# Patient Record
Sex: MALE | Race: NATIVE HAWAIIAN OR OTHER PACIFIC ISLANDER | HISPANIC OR LATINO | Employment: FULL TIME | URBAN - NONMETROPOLITAN AREA
[De-identification: names, ages, dates, MRNs, and addresses within clinical notes are randomized per-mention and may not be internally consistent; named-entity substitution may affect disease eponyms.]

---

## 2024-06-03 ENCOUNTER — HOSPITAL ENCOUNTER (EMERGENCY)
Facility: HOSPITAL | Age: 44
Discharge: LEFT BEFORE DISCHARGE | End: 2024-06-03
Attending: EMERGENCY MEDICINE | Admitting: EMERGENCY MEDICINE

## 2024-06-03 ENCOUNTER — APPOINTMENT (OUTPATIENT)
Dept: GENERAL RADIOLOGY | Facility: HOSPITAL | Age: 44
End: 2024-06-03

## 2024-06-03 VITALS
BODY MASS INDEX: 27.55 KG/M2 | SYSTOLIC BLOOD PRESSURE: 113 MMHG | HEIGHT: 65 IN | DIASTOLIC BLOOD PRESSURE: 75 MMHG | WEIGHT: 165.34 LBS | OXYGEN SATURATION: 95 % | RESPIRATION RATE: 17 BRPM | TEMPERATURE: 98.1 F | HEART RATE: 70 BPM

## 2024-06-03 DIAGNOSIS — I45.9 HEART BLOCK: Primary | ICD-10-CM

## 2024-06-03 LAB
ALBUMIN SERPL-MCNC: 3.8 G/DL (ref 3.5–5.2)
ALBUMIN/GLOB SERPL: 1.1 G/DL
ALP SERPL-CCNC: 84 U/L (ref 39–117)
ALT SERPL W P-5'-P-CCNC: 19 U/L (ref 1–41)
ANION GAP SERPL CALCULATED.3IONS-SCNC: 12.1 MMOL/L (ref 5–15)
AST SERPL-CCNC: 14 U/L (ref 1–40)
BASOPHILS # BLD AUTO: 0.03 10*3/MM3 (ref 0–0.2)
BASOPHILS NFR BLD AUTO: 0.3 % (ref 0–1.5)
BILIRUB SERPL-MCNC: 0.4 MG/DL (ref 0–1.2)
BUN SERPL-MCNC: 13 MG/DL (ref 6–20)
BUN/CREAT SERPL: 15.5 (ref 7–25)
CALCIUM SPEC-SCNC: 8.9 MG/DL (ref 8.6–10.5)
CHLORIDE SERPL-SCNC: 105 MMOL/L (ref 98–107)
CO2 SERPL-SCNC: 23.9 MMOL/L (ref 22–29)
CREAT SERPL-MCNC: 0.84 MG/DL (ref 0.76–1.27)
CRP SERPL-MCNC: 9.68 MG/DL (ref 0–0.5)
DEPRECATED RDW RBC AUTO: 44.2 FL (ref 37–54)
EGFRCR SERPLBLD CKD-EPI 2021: 112.4 ML/MIN/1.73
EOSINOPHIL # BLD AUTO: 0.3 10*3/MM3 (ref 0–0.4)
EOSINOPHIL NFR BLD AUTO: 3.5 % (ref 0.3–6.2)
ERYTHROCYTE [DISTWIDTH] IN BLOOD BY AUTOMATED COUNT: 13.5 % (ref 12.3–15.4)
GLOBULIN UR ELPH-MCNC: 3.5 GM/DL
GLUCOSE SERPL-MCNC: 104 MG/DL (ref 65–99)
HCT VFR BLD AUTO: 40.9 % (ref 37.5–51)
HGB BLD-MCNC: 13.9 G/DL (ref 13–17.7)
HOLD SPECIMEN: NORMAL
HOLD SPECIMEN: NORMAL
IMM GRANULOCYTES # BLD AUTO: 0.04 10*3/MM3 (ref 0–0.05)
IMM GRANULOCYTES NFR BLD AUTO: 0.5 % (ref 0–0.5)
LYMPHOCYTES # BLD AUTO: 1.24 10*3/MM3 (ref 0.7–3.1)
LYMPHOCYTES NFR BLD AUTO: 14.4 % (ref 19.6–45.3)
MAGNESIUM SERPL-MCNC: 2.1 MG/DL (ref 1.6–2.6)
MCH RBC QN AUTO: 30.5 PG (ref 26.6–33)
MCHC RBC AUTO-ENTMCNC: 34 G/DL (ref 31.5–35.7)
MCV RBC AUTO: 89.9 FL (ref 79–97)
MONOCYTES # BLD AUTO: 0.53 10*3/MM3 (ref 0.1–0.9)
MONOCYTES NFR BLD AUTO: 6.2 % (ref 5–12)
NEUTROPHILS NFR BLD AUTO: 6.46 10*3/MM3 (ref 1.7–7)
NEUTROPHILS NFR BLD AUTO: 75.1 % (ref 42.7–76)
NRBC BLD AUTO-RTO: 0 /100 WBC (ref 0–0.2)
PLATELET # BLD AUTO: 377 10*3/MM3 (ref 140–450)
PMV BLD AUTO: 9.3 FL (ref 6–12)
POTASSIUM SERPL-SCNC: 3.5 MMOL/L (ref 3.5–5.2)
PROT SERPL-MCNC: 7.3 G/DL (ref 6–8.5)
QT INTERVAL: 406 MS
QTC INTERVAL: 431 MS
RBC # BLD AUTO: 4.55 10*6/MM3 (ref 4.14–5.8)
SODIUM SERPL-SCNC: 141 MMOL/L (ref 136–145)
TROPONIN T SERPL HS-MCNC: <6 NG/L
TSH SERPL DL<=0.05 MIU/L-ACNC: 2.28 UIU/ML (ref 0.27–4.2)
WBC NRBC COR # BLD AUTO: 8.6 10*3/MM3 (ref 3.4–10.8)
WHOLE BLOOD HOLD COAG: NORMAL
WHOLE BLOOD HOLD SPECIMEN: NORMAL

## 2024-06-03 PROCEDURE — 93005 ELECTROCARDIOGRAM TRACING: CPT | Performed by: STUDENT IN AN ORGANIZED HEALTH CARE EDUCATION/TRAINING PROGRAM

## 2024-06-03 PROCEDURE — 71045 X-RAY EXAM CHEST 1 VIEW: CPT

## 2024-06-03 PROCEDURE — 83735 ASSAY OF MAGNESIUM: CPT | Performed by: PHYSICIAN ASSISTANT

## 2024-06-03 PROCEDURE — 84484 ASSAY OF TROPONIN QUANT: CPT | Performed by: STUDENT IN AN ORGANIZED HEALTH CARE EDUCATION/TRAINING PROGRAM

## 2024-06-03 PROCEDURE — 36415 COLL VENOUS BLD VENIPUNCTURE: CPT | Performed by: STUDENT IN AN ORGANIZED HEALTH CARE EDUCATION/TRAINING PROGRAM

## 2024-06-03 PROCEDURE — 80053 COMPREHEN METABOLIC PANEL: CPT | Performed by: STUDENT IN AN ORGANIZED HEALTH CARE EDUCATION/TRAINING PROGRAM

## 2024-06-03 PROCEDURE — 84443 ASSAY THYROID STIM HORMONE: CPT | Performed by: PHYSICIAN ASSISTANT

## 2024-06-03 PROCEDURE — 99284 EMERGENCY DEPT VISIT MOD MDM: CPT

## 2024-06-03 PROCEDURE — 86140 C-REACTIVE PROTEIN: CPT | Performed by: PHYSICIAN ASSISTANT

## 2024-06-03 PROCEDURE — 85025 COMPLETE CBC W/AUTO DIFF WBC: CPT | Performed by: STUDENT IN AN ORGANIZED HEALTH CARE EDUCATION/TRAINING PROGRAM

## 2024-06-03 PROCEDURE — 71045 X-RAY EXAM CHEST 1 VIEW: CPT | Performed by: RADIOLOGY

## 2024-06-03 PROCEDURE — 93005 ELECTROCARDIOGRAM TRACING: CPT | Performed by: EMERGENCY MEDICINE

## 2024-06-03 RX ORDER — SODIUM CHLORIDE 0.9 % (FLUSH) 0.9 %
10 SYRINGE (ML) INJECTION AS NEEDED
Status: DISCONTINUED | OUTPATIENT
Start: 2024-06-03 | End: 2024-06-03 | Stop reason: HOSPADM

## 2024-06-03 RX ORDER — ATORVASTATIN CALCIUM 10 MG/1
20 TABLET, FILM COATED ORAL DAILY
Qty: 90 TABLET | Refills: 0 | Status: SHIPPED | OUTPATIENT
Start: 2024-06-03

## 2024-06-03 RX ORDER — ASPIRIN 325 MG
325 TABLET ORAL ONCE
Status: DISCONTINUED | OUTPATIENT
Start: 2024-06-03 | End: 2024-06-03 | Stop reason: HOSPADM

## 2024-06-03 RX ORDER — ASPIRIN 325 MG
325 TABLET, DELAYED RELEASE (ENTERIC COATED) ORAL DAILY
Qty: 90 TABLET | Refills: 0 | Status: SHIPPED | OUTPATIENT
Start: 2024-06-03

## 2024-06-03 RX ORDER — ATORVASTATIN CALCIUM 20 MG/1
20 TABLET, FILM COATED ORAL NIGHTLY
Status: DISCONTINUED | OUTPATIENT
Start: 2024-06-03 | End: 2024-06-03 | Stop reason: HOSPADM

## 2024-06-03 NOTE — ED NOTES
MEDICAL SCREENING:    Reason for Visit: CP x 3 days    Patient initially seen in triage.  The patient was advised further evaluation and diagnostic testing will be needed, some of the treatment and testing will be initiated in the lobby in order to begin the process.  The patient will be returned to the waiting area for the time being and possibly be re-assessed by a subsequent ED provider.  The patient will be brought back to the treatment area in as timely manner as possible.       Ebenezer Diana, PA-C  06/03/24 1033

## 2024-06-03 NOTE — ED NOTES
Patient requesting to leave AMA at this time. Dr. Al at bedside, Patient states he understands the risks against signing out against medical advise and is agreeable.

## 2024-06-03 NOTE — ED NOTES
Patient remains in ER lobby. Patient and friend accompanying explained wait due to high patient volume, voices understanding. Patient voices no change in condition or concerns. Patient and friend instructed to notify triage RN or ED staff of any needs or concerns. CATA

## 2024-06-03 NOTE — ED PROVIDER NOTES
Subjective   History of Present Illness  Presents to eR with chest pressure for two days. He denies palpitations    Chest Pain  Pain location:  Substernal area  Pain quality: pressure    Pain radiates to:  Does not radiate  Pain severity:  Mild  Onset quality:  Gradual  Duration:  2 days  Timing:  Constant  Chronicity:  New      Review of Systems   Constitutional:  Positive for activity change.   HENT: Negative.     Eyes: Negative.    Respiratory: Negative.     Cardiovascular:  Positive for chest pain.   Gastrointestinal: Negative.    Endocrine: Negative.    Genitourinary: Negative.    Musculoskeletal: Negative.    Allergic/Immunologic: Negative.    Neurological: Negative.    Hematological: Negative.    Psychiatric/Behavioral: Negative.         No past medical history on file.    No Known Allergies    No past surgical history on file.    No family history on file.    Social History     Socioeconomic History    Marital status:            Objective   Physical Exam  Vitals and nursing note reviewed.   Constitutional:       Appearance: He is well-developed.   HENT:      Head: Normocephalic.   Eyes:      Pupils: Pupils are equal, round, and reactive to light.   Cardiovascular:      Rate and Rhythm: Normal rate. Rhythm irregular.      Heart sounds: Normal heart sounds.   Pulmonary:      Effort: Pulmonary effort is normal.      Breath sounds: Examination of the right-lower field reveals decreased breath sounds. Examination of the left-lower field reveals decreased breath sounds.   Abdominal:      Palpations: Abdomen is soft.   Musculoskeletal:         General: Normal range of motion.      Cervical back: Normal range of motion.   Skin:     General: Skin is warm and dry.   Neurological:      General: No focal deficit present.      Mental Status: He is alert.         Procedures           ED Course                                             Medical Decision Making  Amount and/or Complexity of Data Reviewed  Labs:  ordered.  Radiology: ordered.  ECG/medicine tests: ordered.    Risk  OTC drugs.  Prescription drug management.        Final diagnoses:   Heart block       ED Disposition  ED Disposition       ED Disposition   AMA    Condition   --    Comment   --               Nathan Brooke MD  [address] [place] [id number] [phone]               Medication List        New Prescriptions      aspirin 325 MG EC tablet  Take 1 tablet by mouth Daily.     atorvastatin 10 MG tablet  Commonly known as: LIPITOR  Take 2 tablets by mouth Daily.               Where to Get Your Medications        These medications were sent to [id number] - [place], [place] - [address] [facility] - [phone]  - [phone]   [address] [place] [address]      Phone: [phone]   aspirin 325 MG EC tablet  atorvastatin 10 MG tablet            Chapito Al MD  06/03/24 8948

## 2024-06-13 ENCOUNTER — HOSPITAL ENCOUNTER (INPATIENT)
Facility: HOSPITAL | Age: 44
LOS: 1 days | Discharge: HOME | DRG: 310 | End: 2024-06-14
Attending: EMERGENCY MEDICINE | Admitting: INTERNAL MEDICINE

## 2024-06-13 ENCOUNTER — APPOINTMENT (OUTPATIENT)
Dept: GENERAL RADIOLOGY | Facility: HOSPITAL | Age: 44
DRG: 310 | End: 2024-06-13

## 2024-06-13 DIAGNOSIS — I44.2 COMPLETE HEART BLOCK: Primary | ICD-10-CM

## 2024-06-13 LAB
ALBUMIN SERPL-MCNC: 3.9 G/DL (ref 3.5–5.2)
ALBUMIN/GLOB SERPL: 1.1 G/DL
ALP SERPL-CCNC: 105 U/L (ref 39–117)
ALT SERPL W P-5'-P-CCNC: 45 U/L (ref 1–41)
ANION GAP SERPL CALCULATED.3IONS-SCNC: 10.3 MMOL/L (ref 5–15)
AST SERPL-CCNC: 37 U/L (ref 1–40)
BASOPHILS # BLD AUTO: 0.03 10*3/MM3 (ref 0–0.2)
BASOPHILS NFR BLD AUTO: 0.4 % (ref 0–1.5)
BILIRUB SERPL-MCNC: 0.2 MG/DL (ref 0–1.2)
BUN SERPL-MCNC: 12 MG/DL (ref 6–20)
BUN/CREAT SERPL: 14.8 (ref 7–25)
CALCIUM SPEC-SCNC: 9.3 MG/DL (ref 8.6–10.5)
CHLORIDE SERPL-SCNC: 105 MMOL/L (ref 98–107)
CO2 SERPL-SCNC: 23.7 MMOL/L (ref 22–29)
CREAT SERPL-MCNC: 0.81 MG/DL (ref 0.76–1.27)
DEPRECATED RDW RBC AUTO: 45.4 FL (ref 37–54)
EGFRCR SERPLBLD CKD-EPI 2021: 113.6 ML/MIN/1.73
EOSINOPHIL # BLD AUTO: 0.32 10*3/MM3 (ref 0–0.4)
EOSINOPHIL NFR BLD AUTO: 4.1 % (ref 0.3–6.2)
ERYTHROCYTE [DISTWIDTH] IN BLOOD BY AUTOMATED COUNT: 13.8 % (ref 12.3–15.4)
GEN 5 2HR TROPONIN T REFLEX: 7 NG/L
GLOBULIN UR ELPH-MCNC: 3.6 GM/DL
GLUCOSE SERPL-MCNC: 99 MG/DL (ref 65–99)
HBA1C MFR BLD: 5.9 % (ref 4.8–5.6)
HCT VFR BLD AUTO: 41.1 % (ref 37.5–51)
HGB BLD-MCNC: 13.9 G/DL (ref 13–17.7)
HOLD SPECIMEN: NORMAL
HOLD SPECIMEN: NORMAL
IMM GRANULOCYTES # BLD AUTO: 0.04 10*3/MM3 (ref 0–0.05)
IMM GRANULOCYTES NFR BLD AUTO: 0.5 % (ref 0–0.5)
INR PPP: 0.99 (ref 0.9–1.1)
LYMPHOCYTES # BLD AUTO: 2.2 10*3/MM3 (ref 0.7–3.1)
LYMPHOCYTES NFR BLD AUTO: 28.2 % (ref 19.6–45.3)
MAGNESIUM SERPL-MCNC: 2.3 MG/DL (ref 1.6–2.6)
MCH RBC QN AUTO: 30.5 PG (ref 26.6–33)
MCHC RBC AUTO-ENTMCNC: 33.8 G/DL (ref 31.5–35.7)
MCV RBC AUTO: 90.3 FL (ref 79–97)
MONOCYTES # BLD AUTO: 0.68 10*3/MM3 (ref 0.1–0.9)
MONOCYTES NFR BLD AUTO: 8.7 % (ref 5–12)
NEUTROPHILS NFR BLD AUTO: 4.54 10*3/MM3 (ref 1.7–7)
NEUTROPHILS NFR BLD AUTO: 58.1 % (ref 42.7–76)
NRBC BLD AUTO-RTO: 0 /100 WBC (ref 0–0.2)
PHOSPHATE SERPL-MCNC: 3.7 MG/DL (ref 2.5–4.5)
PLATELET # BLD AUTO: 391 10*3/MM3 (ref 140–450)
PMV BLD AUTO: 9.1 FL (ref 6–12)
POTASSIUM SERPL-SCNC: 3.9 MMOL/L (ref 3.5–5.2)
PROT SERPL-MCNC: 7.5 G/DL (ref 6–8.5)
PROTHROMBIN TIME: 13.2 SECONDS (ref 12.1–14.7)
RBC # BLD AUTO: 4.55 10*6/MM3 (ref 4.14–5.8)
SODIUM SERPL-SCNC: 139 MMOL/L (ref 136–145)
TROPONIN T DELTA: 0 NG/L
TROPONIN T SERPL HS-MCNC: 7 NG/L
WBC NRBC COR # BLD AUTO: 7.81 10*3/MM3 (ref 3.4–10.8)
WHOLE BLOOD HOLD COAG: NORMAL
WHOLE BLOOD HOLD SPECIMEN: NORMAL

## 2024-06-13 PROCEDURE — 85025 COMPLETE CBC W/AUTO DIFF WBC: CPT | Performed by: EMERGENCY MEDICINE

## 2024-06-13 PROCEDURE — 83036 HEMOGLOBIN GLYCOSYLATED A1C: CPT | Performed by: INTERNAL MEDICINE

## 2024-06-13 PROCEDURE — 84484 ASSAY OF TROPONIN QUANT: CPT | Performed by: EMERGENCY MEDICINE

## 2024-06-13 PROCEDURE — 71045 X-RAY EXAM CHEST 1 VIEW: CPT | Performed by: RADIOLOGY

## 2024-06-13 PROCEDURE — 83735 ASSAY OF MAGNESIUM: CPT | Performed by: EMERGENCY MEDICINE

## 2024-06-13 PROCEDURE — 99285 EMERGENCY DEPT VISIT HI MDM: CPT

## 2024-06-13 PROCEDURE — 99223 1ST HOSP IP/OBS HIGH 75: CPT | Performed by: INTERNAL MEDICINE

## 2024-06-13 PROCEDURE — 93005 ELECTROCARDIOGRAM TRACING: CPT | Performed by: EMERGENCY MEDICINE

## 2024-06-13 PROCEDURE — 93010 ELECTROCARDIOGRAM REPORT: CPT | Performed by: INTERNAL MEDICINE

## 2024-06-13 PROCEDURE — 36415 COLL VENOUS BLD VENIPUNCTURE: CPT

## 2024-06-13 PROCEDURE — 71045 X-RAY EXAM CHEST 1 VIEW: CPT

## 2024-06-13 PROCEDURE — 93005 ELECTROCARDIOGRAM TRACING: CPT | Performed by: STUDENT IN AN ORGANIZED HEALTH CARE EDUCATION/TRAINING PROGRAM

## 2024-06-13 PROCEDURE — 80053 COMPREHEN METABOLIC PANEL: CPT | Performed by: EMERGENCY MEDICINE

## 2024-06-13 PROCEDURE — 85610 PROTHROMBIN TIME: CPT | Performed by: EMERGENCY MEDICINE

## 2024-06-13 PROCEDURE — 84100 ASSAY OF PHOSPHORUS: CPT | Performed by: INTERNAL MEDICINE

## 2024-06-13 RX ORDER — DEXTROSE MONOHYDRATE 25 G/50ML
25 INJECTION, SOLUTION INTRAVENOUS
Status: DISCONTINUED | OUTPATIENT
Start: 2024-06-13 | End: 2024-06-14 | Stop reason: HOSPADM

## 2024-06-13 RX ORDER — ATORVASTATIN CALCIUM 20 MG/1
20 TABLET, FILM COATED ORAL DAILY
Status: CANCELLED | OUTPATIENT
Start: 2024-06-14

## 2024-06-13 RX ORDER — GLUCAGON 1 MG/ML
1 KIT INJECTION
Status: DISCONTINUED | OUTPATIENT
Start: 2024-06-13 | End: 2024-06-14 | Stop reason: HOSPADM

## 2024-06-13 RX ORDER — ASPIRIN 81 MG/1
324 TABLET, CHEWABLE ORAL ONCE
Status: COMPLETED | OUTPATIENT
Start: 2024-06-13 | End: 2024-06-13

## 2024-06-13 RX ORDER — SODIUM CHLORIDE 0.9 % (FLUSH) 0.9 %
10 SYRINGE (ML) INJECTION EVERY 12 HOURS SCHEDULED
Status: DISCONTINUED | OUTPATIENT
Start: 2024-06-13 | End: 2024-06-14 | Stop reason: HOSPADM

## 2024-06-13 RX ORDER — NITROGLYCERIN 0.4 MG/1
0.4 TABLET SUBLINGUAL
Status: DISCONTINUED | OUTPATIENT
Start: 2024-06-13 | End: 2024-06-14 | Stop reason: HOSPADM

## 2024-06-13 RX ORDER — BISACODYL 5 MG/1
5 TABLET, DELAYED RELEASE ORAL DAILY PRN
Status: DISCONTINUED | OUTPATIENT
Start: 2024-06-13 | End: 2024-06-14 | Stop reason: HOSPADM

## 2024-06-13 RX ORDER — NICOTINE POLACRILEX 4 MG
15 LOZENGE BUCCAL
Status: DISCONTINUED | OUTPATIENT
Start: 2024-06-13 | End: 2024-06-14 | Stop reason: HOSPADM

## 2024-06-13 RX ORDER — POLYETHYLENE GLYCOL 3350 17 G/17G
17 POWDER, FOR SOLUTION ORAL DAILY PRN
Status: DISCONTINUED | OUTPATIENT
Start: 2024-06-13 | End: 2024-06-14 | Stop reason: HOSPADM

## 2024-06-13 RX ORDER — ASPIRIN 81 MG/1
81 TABLET ORAL DAILY
Status: DISCONTINUED | OUTPATIENT
Start: 2024-06-14 | End: 2024-06-14 | Stop reason: HOSPADM

## 2024-06-13 RX ORDER — SODIUM CHLORIDE 9 MG/ML
40 INJECTION, SOLUTION INTRAVENOUS AS NEEDED
Status: DISCONTINUED | OUTPATIENT
Start: 2024-06-13 | End: 2024-06-14 | Stop reason: HOSPADM

## 2024-06-13 RX ORDER — ASPIRIN 325 MG
325 TABLET, DELAYED RELEASE (ENTERIC COATED) ORAL DAILY
Status: CANCELLED | OUTPATIENT
Start: 2024-06-14

## 2024-06-13 RX ORDER — SODIUM CHLORIDE 0.9 % (FLUSH) 0.9 %
10 SYRINGE (ML) INJECTION AS NEEDED
Status: DISCONTINUED | OUTPATIENT
Start: 2024-06-13 | End: 2024-06-14 | Stop reason: HOSPADM

## 2024-06-13 RX ORDER — AMOXICILLIN 250 MG
2 CAPSULE ORAL 2 TIMES DAILY PRN
Status: DISCONTINUED | OUTPATIENT
Start: 2024-06-13 | End: 2024-06-14 | Stop reason: HOSPADM

## 2024-06-13 RX ORDER — BISACODYL 10 MG
10 SUPPOSITORY, RECTAL RECTAL DAILY PRN
Status: DISCONTINUED | OUTPATIENT
Start: 2024-06-13 | End: 2024-06-14 | Stop reason: HOSPADM

## 2024-06-13 RX ORDER — HEPARIN SODIUM 5000 [USP'U]/ML
5000 INJECTION, SOLUTION INTRAVENOUS; SUBCUTANEOUS EVERY 12 HOURS SCHEDULED
Status: DISCONTINUED | OUTPATIENT
Start: 2024-06-13 | End: 2024-06-14 | Stop reason: HOSPADM

## 2024-06-13 RX ADMIN — ASPIRIN 324 MG: 81 TABLET, CHEWABLE ORAL at 17:33

## 2024-06-13 NOTE — ED PROVIDER NOTES
Subjective   History of Present Illness  43-year-old  male who presents today for evaluation of chest pain/follow-up.  Patient was seen in emergency department on Isi 3.  He was diagnosed with complete heart block at that time.  He apparently did not want to stay, and left AMA.  Patient apparently was called back by the hospital and told to come back today for an appointment at 5.  He states he is currently is asymptomatic he has no complaints of chest pain, palpitations, he states sometimes he does gets mildly short of breath, and sometimes some mild orthopnea.  He states he does work during the day, he has no complaints while working.  No leg pain or leg swelling, no fevers, chills, sweats, no nausea, no vomiting, no cough, no hemoptysis.  Symptoms are minimal currently, no exacerbating alleviating factors.    History provided by:  Patient   used: Yes        Review of Systems   All other systems reviewed and are negative.      No past medical history on file.    No Known Allergies    No past surgical history on file.    No family history on file.    Social History     Socioeconomic History    Marital status:            Objective   Physical Exam  Vitals and nursing note reviewed.   Constitutional:       Appearance: He is well-developed.      Comments: Pleasant well-appearing 43-year-old male lying in bed no acute distress, nontoxic, well-appearing   HENT:      Head: Normocephalic and atraumatic.      Nose: Nose normal.   Eyes:      Conjunctiva/sclera: Conjunctivae normal.      Pupils: Pupils are equal, round, and reactive to light.   Neck:      Vascular: No JVD.      Trachea: No tracheal deviation.   Cardiovascular:      Rate and Rhythm: Normal rate and regular rhythm.   Pulmonary:      Effort: Pulmonary effort is normal.      Comments: Patient speaking in full sentences, no respiratory distress, no retractions, no abdominal breathing.  Abdominal:      Palpations: Abdomen is  soft.   Musculoskeletal:         General: Normal range of motion.      Cervical back: Normal range of motion and neck supple.      Comments: No lower extremity edema, no palpable cord, compartment soft without reproducible tenderness.   Skin:     General: Skin is warm and dry.   Neurological:      Mental Status: He is alert.         Procedures           ED Course  ED Course as of 06/13/24 2135   Thu Jun 13, 2024   1734 ECG 12 Lead ED Triage Standing Order; Chest Pain  Sinus rhythm with first-degree AV block rate of 83 QRS 94 QTc 427, no ST segment elevation, depression, T wave versions or signs of acute ischemia no signs of acute dysrhythmia.  Unremarkable EKG, interpreted by myself.  Compared to EKG from Isi 3, the complete heart block is now resolved.      Electronically signed by Constantin Woody MD, 06/13/24, 5:34 PM EDT.   [SM]      ED Course User Index  [SM] Constantin Woody MD                                             Medical Decision Making  43-year-old male who presents today for evaluation after leaving AGAINST MEDICAL ADVICE on Isi 3.  Patient was seen for chest pain, he is found to have a third-degree heart block.  Admission was discussed however the patient did not want to stay.  His workup is unremarkable today, he is asymptomatic, his EKG shows normal sinus rhythm with first-degree AV block, blood work is unremarkable signs of electrolyte abnormality, or signs of cardiac strain or injury.  I discussed case with cardiologist, who requested the patient be admitted for further testing and evaluation.  Plan was discussed with the patient via , and he voices understanding and is in agreement to staying for further workup.  Patient will be admitted to the hospital service for further management.    Problems Addressed:  Complete heart block: complicated acute illness or injury    Amount and/or Complexity of Data Reviewed  Labs: ordered.  Radiology: ordered.  ECG/medicine tests:  ordered. Decision-making details documented in ED Course.    Risk  OTC drugs.  Prescription drug management.  Decision regarding hospitalization.        Final diagnoses:   Complete heart block       ED Disposition  ED Disposition       ED Disposition   Decision to Admit    Condition   --    Comment   Level of Care: Telemetry [5]   Diagnosis: Third degree heart block [219010]   Certification: I Certify That Inpatient Hospital Services Are Medically Necessary For Greater Than 2 Midnights                 No follow-up provider specified.       Medication List      No changes were made to your prescriptions during this visit.            Constantin Woody MD  06/13/24 2382

## 2024-06-14 ENCOUNTER — APPOINTMENT (OUTPATIENT)
Dept: CARDIOLOGY | Facility: HOSPITAL | Age: 44
DRG: 310 | End: 2024-06-14

## 2024-06-14 ENCOUNTER — APPOINTMENT (OUTPATIENT)
Dept: NUCLEAR MEDICINE | Facility: HOSPITAL | Age: 44
DRG: 310 | End: 2024-06-14

## 2024-06-14 VITALS
BODY MASS INDEX: 33.47 KG/M2 | HEART RATE: 71 BPM | DIASTOLIC BLOOD PRESSURE: 68 MMHG | RESPIRATION RATE: 17 BRPM | SYSTOLIC BLOOD PRESSURE: 117 MMHG | HEIGHT: 65 IN | WEIGHT: 200.9 LBS | TEMPERATURE: 97.6 F | OXYGEN SATURATION: 95 %

## 2024-06-14 PROBLEM — I44.2 THIRD DEGREE HEART BLOCK: Status: RESOLVED | Noted: 2024-06-13 | Resolved: 2024-06-14

## 2024-06-14 LAB
ALBUMIN SERPL-MCNC: 3.4 G/DL (ref 3.5–5.2)
ALBUMIN/GLOB SERPL: 1 G/DL
ALP SERPL-CCNC: 93 U/L (ref 39–117)
ALT SERPL W P-5'-P-CCNC: 40 U/L (ref 1–41)
AMPHET+METHAMPHET UR QL: NEGATIVE
AMPHETAMINES UR QL: NEGATIVE
ANION GAP SERPL CALCULATED.3IONS-SCNC: 9.4 MMOL/L (ref 5–15)
AST SERPL-CCNC: 31 U/L (ref 1–40)
BARBITURATES UR QL SCN: NEGATIVE
BENZODIAZ UR QL SCN: NEGATIVE
BH CV ECHO MEAS - AO MAX PG: 7.7 MMHG
BH CV ECHO MEAS - AO MEAN PG: 5 MMHG
BH CV ECHO MEAS - AO ROOT DIAM: 3.3 CM
BH CV ECHO MEAS - AO V2 MAX: 139 CM/SEC
BH CV ECHO MEAS - AO V2 VTI: 28.3 CM
BH CV ECHO MEAS - EDV(CUBED): 125 ML
BH CV ECHO MEAS - EDV(MOD-SP4): 94.5 ML
BH CV ECHO MEAS - EF(MOD-SP4): 71.6 %
BH CV ECHO MEAS - ESV(CUBED): 46.7 ML
BH CV ECHO MEAS - ESV(MOD-SP4): 26.8 ML
BH CV ECHO MEAS - FS: 28 %
BH CV ECHO MEAS - IVS/LVPW: 1.08 CM
BH CV ECHO MEAS - IVSD: 1.4 CM
BH CV ECHO MEAS - LA DIMENSION: 3.5 CM
BH CV ECHO MEAS - LAT PEAK E' VEL: 13.3 CM/SEC
BH CV ECHO MEAS - LV DIASTOLIC VOL/BSA (35-75): 47.8 CM2
BH CV ECHO MEAS - LV MASS(C)D: 276.4 GRAMS
BH CV ECHO MEAS - LV SYSTOLIC VOL/BSA (12-30): 13.5 CM2
BH CV ECHO MEAS - LVIDD: 5 CM
BH CV ECHO MEAS - LVIDS: 3.6 CM
BH CV ECHO MEAS - LVOT AREA: 3.5 CM2
BH CV ECHO MEAS - LVOT DIAM: 2.1 CM
BH CV ECHO MEAS - LVPWD: 1.3 CM
BH CV ECHO MEAS - MED PEAK E' VEL: 9.7 CM/SEC
BH CV ECHO MEAS - MV A MAX VEL: 63 CM/SEC
BH CV ECHO MEAS - MV E MAX VEL: 82.3 CM/SEC
BH CV ECHO MEAS - MV E/A: 1.31
BH CV ECHO MEAS - PA ACC TIME: 0.07 SEC
BH CV ECHO MEAS - RAP SYSTOLE: 10 MMHG
BH CV ECHO MEAS - RVSP: 39.2 MMHG
BH CV ECHO MEAS - SV(MOD-SP4): 67.7 ML
BH CV ECHO MEAS - SVI(MOD-SP4): 34.2 ML/M2
BH CV ECHO MEAS - TAPSE (>1.6): 3.2 CM
BH CV ECHO MEAS - TR MAX PG: 29.2 MMHG
BH CV ECHO MEAS - TR MAX VEL: 270 CM/SEC
BH CV ECHO MEASUREMENTS AVERAGE E/E' RATIO: 7.16
BH CV NUCLEAR PRIOR STUDY: 3
BH CV REST NUCLEAR ISOTOPE DOSE: 9.5 MCI
BH CV STRESS BP STAGE 1: NORMAL
BH CV STRESS BP STAGE 2: NORMAL
BH CV STRESS BP STAGE 3: NORMAL
BH CV STRESS DURATION MIN STAGE 1: 3
BH CV STRESS DURATION MIN STAGE 2: 3
BH CV STRESS DURATION MIN STAGE 3: 3
BH CV STRESS DURATION SEC STAGE 1: 0
BH CV STRESS DURATION SEC STAGE 2: 0
BH CV STRESS DURATION SEC STAGE 3: 0
BH CV STRESS DURATION SEC STAGE 4: 24
BH CV STRESS GRADE STAGE 1: 10
BH CV STRESS GRADE STAGE 2: 12
BH CV STRESS GRADE STAGE 3: 14
BH CV STRESS GRADE STAGE 4: 16
BH CV STRESS HR STAGE 1: 100
BH CV STRESS HR STAGE 2: 120
BH CV STRESS HR STAGE 3: 141
BH CV STRESS HR STAGE 4: 144
BH CV STRESS METS STAGE 1: 5
BH CV STRESS METS STAGE 2: 7.5
BH CV STRESS METS STAGE 3: 10
BH CV STRESS METS STAGE 4: 13.5
BH CV STRESS NUCLEAR ISOTOPE DOSE: 28.6 MCI
BH CV STRESS PROTOCOL 1: NORMAL
BH CV STRESS RECOVERY BP: NORMAL MMHG
BH CV STRESS RECOVERY HR: 82 BPM
BH CV STRESS SPEED STAGE 1: 1.7
BH CV STRESS SPEED STAGE 2: 2.5
BH CV STRESS SPEED STAGE 3: 3.4
BH CV STRESS SPEED STAGE 4: 4.2
BH CV STRESS STAGE 1: 1
BH CV STRESS STAGE 2: 2
BH CV STRESS STAGE 3: 3
BH CV STRESS STAGE 4: 4
BILIRUB SERPL-MCNC: 0.2 MG/DL (ref 0–1.2)
BILIRUB UR QL STRIP: NEGATIVE
BUN SERPL-MCNC: 11 MG/DL (ref 6–20)
BUN/CREAT SERPL: 14.7 (ref 7–25)
BUPRENORPHINE SERPL-MCNC: NEGATIVE NG/ML
CALCIUM SPEC-SCNC: 8.7 MG/DL (ref 8.6–10.5)
CANNABINOIDS SERPL QL: NEGATIVE
CHLORIDE SERPL-SCNC: 108 MMOL/L (ref 98–107)
CHOLEST SERPL-MCNC: 118 MG/DL (ref 0–200)
CLARITY UR: CLEAR
CO2 SERPL-SCNC: 22.6 MMOL/L (ref 22–29)
COCAINE UR QL: NEGATIVE
COLOR UR: YELLOW
CREAT SERPL-MCNC: 0.75 MG/DL (ref 0.76–1.27)
DEPRECATED RDW RBC AUTO: 46.4 FL (ref 37–54)
EGFRCR SERPLBLD CKD-EPI 2021: 116.3 ML/MIN/1.73
ERYTHROCYTE [DISTWIDTH] IN BLOOD BY AUTOMATED COUNT: 13.9 % (ref 12.3–15.4)
FENTANYL UR-MCNC: NEGATIVE NG/ML
GLOBULIN UR ELPH-MCNC: 3.5 GM/DL
GLUCOSE SERPL-MCNC: 108 MG/DL (ref 65–99)
GLUCOSE UR STRIP-MCNC: NEGATIVE MG/DL
HCT VFR BLD AUTO: 42 % (ref 37.5–51)
HDLC SERPL-MCNC: 31 MG/DL (ref 40–60)
HGB BLD-MCNC: 13.8 G/DL (ref 13–17.7)
HGB UR QL STRIP.AUTO: NEGATIVE
KETONES UR QL STRIP: NEGATIVE
LDLC SERPL CALC-MCNC: 60 MG/DL (ref 0–100)
LDLC/HDLC SERPL: 1.8 {RATIO}
LEFT ATRIUM VOLUME INDEX: 11.9 ML/M2
LEUKOCYTE ESTERASE UR QL STRIP.AUTO: NEGATIVE
LV EF NUC BP: 72 %
MAGNESIUM SERPL-MCNC: 2.2 MG/DL (ref 1.6–2.6)
MAXIMAL PREDICTED HEART RATE: 179 BPM
MCH RBC QN AUTO: 30.1 PG (ref 26.6–33)
MCHC RBC AUTO-ENTMCNC: 32.9 G/DL (ref 31.5–35.7)
MCV RBC AUTO: 91.7 FL (ref 79–97)
METHADONE UR QL SCN: NEGATIVE
NITRITE UR QL STRIP: NEGATIVE
OPIATES UR QL: NEGATIVE
OXYCODONE UR QL SCN: NEGATIVE
PCP UR QL SCN: NEGATIVE
PERCENT MAX PREDICTED HR: 80.45 %
PH UR STRIP.AUTO: 6 [PH] (ref 5–8)
PLATELET # BLD AUTO: 358 10*3/MM3 (ref 140–450)
PMV BLD AUTO: 8.9 FL (ref 6–12)
POTASSIUM SERPL-SCNC: 3.6 MMOL/L (ref 3.5–5.2)
PROT SERPL-MCNC: 6.9 G/DL (ref 6–8.5)
PROT UR QL STRIP: NEGATIVE
QT INTERVAL: 364 MS
QTC INTERVAL: 427 MS
RBC # BLD AUTO: 4.58 10*6/MM3 (ref 4.14–5.8)
SODIUM SERPL-SCNC: 140 MMOL/L (ref 136–145)
SP GR UR STRIP: 1.02 (ref 1–1.03)
STRESS BASELINE BP: NORMAL MMHG
STRESS BASELINE HR: 67 BPM
STRESS PERCENT HR: 95 %
STRESS POST ESTIMATED WORKLOAD: 11.3 METS
STRESS POST EXERCISE DUR MIN: 9 MIN
STRESS POST EXERCISE DUR SEC: 24 SEC
STRESS POST PEAK BP: NORMAL MMHG
STRESS POST PEAK HR: 144 BPM
STRESS TARGET HR: 152 BPM
TRICYCLICS UR QL SCN: NEGATIVE
TRIGL SERPL-MCNC: 156 MG/DL (ref 0–150)
TROPONIN T SERPL HS-MCNC: 8 NG/L
UROBILINOGEN UR QL STRIP: NORMAL
VLDLC SERPL-MCNC: 27 MG/DL (ref 5–40)
WBC NRBC COR # BLD AUTO: 7.58 10*3/MM3 (ref 3.4–10.8)

## 2024-06-14 PROCEDURE — 0 TECHNETIUM SESTAMIBI: Performed by: INTERNAL MEDICINE

## 2024-06-14 PROCEDURE — 93017 CV STRESS TEST TRACING ONLY: CPT

## 2024-06-14 PROCEDURE — 93306 TTE W/DOPPLER COMPLETE: CPT

## 2024-06-14 PROCEDURE — 93018 CV STRESS TEST I&R ONLY: CPT | Performed by: INTERNAL MEDICINE

## 2024-06-14 PROCEDURE — A9500 TC99M SESTAMIBI: HCPCS | Performed by: INTERNAL MEDICINE

## 2024-06-14 PROCEDURE — 80307 DRUG TEST PRSMV CHEM ANLYZR: CPT | Performed by: INTERNAL MEDICINE

## 2024-06-14 PROCEDURE — 25010000002 HEPARIN (PORCINE) PER 1000 UNITS: Performed by: INTERNAL MEDICINE

## 2024-06-14 PROCEDURE — 80061 LIPID PANEL: CPT | Performed by: INTERNAL MEDICINE

## 2024-06-14 PROCEDURE — 99239 HOSP IP/OBS DSCHRG MGMT >30: CPT | Performed by: INTERNAL MEDICINE

## 2024-06-14 PROCEDURE — 78452 HT MUSCLE IMAGE SPECT MULT: CPT

## 2024-06-14 PROCEDURE — 93306 TTE W/DOPPLER COMPLETE: CPT | Performed by: INTERNAL MEDICINE

## 2024-06-14 PROCEDURE — 85027 COMPLETE CBC AUTOMATED: CPT | Performed by: INTERNAL MEDICINE

## 2024-06-14 PROCEDURE — 81003 URINALYSIS AUTO W/O SCOPE: CPT | Performed by: INTERNAL MEDICINE

## 2024-06-14 PROCEDURE — 80053 COMPREHEN METABOLIC PANEL: CPT | Performed by: INTERNAL MEDICINE

## 2024-06-14 PROCEDURE — 84484 ASSAY OF TROPONIN QUANT: CPT | Performed by: INTERNAL MEDICINE

## 2024-06-14 PROCEDURE — 99254 IP/OBS CNSLTJ NEW/EST MOD 60: CPT | Performed by: SPECIALIST

## 2024-06-14 PROCEDURE — 83735 ASSAY OF MAGNESIUM: CPT | Performed by: INTERNAL MEDICINE

## 2024-06-14 PROCEDURE — 78452 HT MUSCLE IMAGE SPECT MULT: CPT | Performed by: INTERNAL MEDICINE

## 2024-06-14 RX ORDER — ATORVASTATIN CALCIUM 40 MG/1
40 TABLET, FILM COATED ORAL DAILY
Status: DISCONTINUED | OUTPATIENT
Start: 2024-06-14 | End: 2024-06-14 | Stop reason: HOSPADM

## 2024-06-14 RX ORDER — ASPIRIN 81 MG/1
81 TABLET ORAL DAILY
Qty: 30 TABLET | Refills: 1 | Status: SHIPPED | OUTPATIENT
Start: 2024-06-14

## 2024-06-14 RX ADMIN — TECHNETIUM TC 99M SESTAMIBI 1 DOSE: 1 INJECTION INTRAVENOUS at 11:30

## 2024-06-14 RX ADMIN — ASPIRIN 81 MG: 81 TABLET, COATED ORAL at 10:10

## 2024-06-14 RX ADMIN — TECHNETIUM TC 99M SESTAMIBI 1 DOSE: 1 INJECTION INTRAVENOUS at 09:26

## 2024-06-14 RX ADMIN — HEPARIN SODIUM 5000 UNITS: 5000 INJECTION INTRAVENOUS; SUBCUTANEOUS at 09:35

## 2024-06-14 RX ADMIN — ATORVASTATIN CALCIUM 40 MG: 40 TABLET, FILM COATED ORAL at 05:39

## 2024-06-14 NOTE — H&P
[facility]    Patient Identification:  Name:  Daquan Singh  Age:  43 y.o.  Sex:  male  :  1980  MRN:  [id number]   Visit Number:  [id number]  Admit Date: 2024   Room number:  3325/1P  Primary Care Physician:  Provider, No Known    Date of Admission: 2024     Subjective     Chief complaint:    Chief Complaint   Patient presents with    Chest Pain     History of presenting illness:  43 y.o. male who was admitted on 2024 from the ED as a call back from his 6/3/2024 ED visit.  The patient has a past medical history of essential hypertension and hyperlipidemia.  He does not have a cardiac history and has never been diagnosed with an MI nor heart failure.  Patient came to our emergency department on 6/3/2024 with substernal chest pressure that radiated to his left arm.  EKG at that time demonstrated a third-degree heart block; troponins were unremarkable.  Unfortunately, the patient left the emergency department AGAINST MEDICAL ADVICE at that time.  Per the patient, he received a phone call today that he had a heart doctor appointment at our hospital.  However, when he arrived, there was no appointment and the patient was directed to the emergency department.  The patient has not had any chest pain since the episode 10 days prior to admission.  His current EKG does not show a third-degree heart block.  Patient states that his bilateral ankles have been swelling for the last 2 to 3 days.  Since the patient had a concerning EKG 10 days ago with concurrent chest pain, the patient is being admitted to the telemetry floor for further evaluation and treatment    Please note that the patient's nighttime bedside nurse was present during my evaluation.  I communicated with the patient via a remote .    ---------------------------------------------------------------------------------------------------------------------   Review of  Systems   Unable to perform ROS: Intubated   Constitutional:  Negative for diaphoresis and fever.   HENT:  Negative for congestion and rhinorrhea.    Respiratory:  Negative for cough and shortness of breath.    Cardiovascular:  Positive for chest pain and leg swelling.        Associated with left arm pain.   Gastrointestinal:  Negative for diarrhea, nausea and vomiting.   Psychiatric/Behavioral:  Negative for agitation, behavioral problems and confusion.    ---------------------------------------------------------------------------------------------------------------------   Past Medical History:   Diagnosis Date    Hyperlipidemia     Hypertension     Third degree heart block 06/13/2024     Past Surgical History:   Procedure Laterality Date    NO PAST SURGERIES       Family History   Problem Relation Age of Onset    Heart disease Other         Not at a young age    Diabetes Other      Social History     Socioeconomic History    Marital status:    Tobacco Use    Smoking status: Never    Smokeless tobacco: Never   Vaping Use    Vaping status: Never Used   Substance and Sexual Activity    Alcohol use: Yes     Alcohol/week: 5.0 standard drinks of alcohol     Types: 5 Cans of beer per week    Drug use: Never    Sexual activity: Defer     ---------------------------------------------------------------------------------------------------------------------   Allergies:  Patient has no known allergies.  ---------------------------------------------------------------------------------------------------------------------   Medications below are reported home medications pulling from within the system; at this time, these medications have not been reconciled unless otherwise specified and are in the verification process for further verification as current home medications.      Prior to Admission Medications       Prescriptions Last Dose Informant Patient Reported? Taking?    aspirin 325 MG EC tablet   No No    Take 1  tablet by mouth Daily.    atorvastatin (LIPITOR) 10 MG tablet   No No    Take 2 tablets by mouth Daily.          Objective     Vital Signs:  Temp:  [97.2 °F (36.2 °C)-98.2 °F (36.8 °C)] 97.7 °F (36.5 °C)  Heart Rate:  [58-83] 81  Resp:  [17-18] 18  BP: (114-151)/(64-89) 114/64    Mean Arterial Pressure (Non-Invasive) for the past 24 hrs (Last 3 readings):   Noninvasive MAP (mmHg)   06/14/24 0313 81   06/14/24 0009 88   06/13/24 2154 114     SpO2:  [90 %-96 %] 90 %  on  Flow (L/min):  [3] 3;   Device (Oxygen Therapy): room air  Body mass index is 33.43 kg/m².    Wt Readings from Last 3 Encounters:   06/13/24 91.1 kg (200 lb 14.4 oz)   06/03/24 75 kg (165 lb 5.5 oz)      ----------------------------------------------------------------------------------------------------------------------  Physical Exam  Vitals and nursing note reviewed. Exam conducted with a chaperone present.   Constitutional:       General: He is awake. He is not in acute distress.     Appearance: He is well-developed. He is obese. He is not ill-appearing, toxic-appearing or diaphoretic.      Comments: On room air.   HENT:      Head: Normocephalic and atraumatic.      Right Ear: External ear normal.      Left Ear: External ear normal.      Nose: Nose normal.   Eyes:      General: No scleral icterus.        Right eye: No discharge.         Left eye: No discharge.      Extraocular Movements: Extraocular movements intact.      Conjunctiva/sclera: Conjunctivae normal.      Pupils: Pupils are equal, round, and reactive to light.   Cardiovascular:      Rate and Rhythm: Normal rate and regular rhythm.      Pulses: Normal pulses.   Pulmonary:      Effort: Pulmonary effort is normal. No respiratory distress.      Breath sounds: No wheezing or rales.   Abdominal:      General: Bowel sounds are normal. There is no distension.      Palpations: Abdomen is soft.   Musculoskeletal:      Right ankle: Swelling present.      Left ankle: Swelling present.       Comments: His ankles have about 1+ pitting edema bilaterally and equally.   Skin:     General: Skin is warm.      Capillary Refill: Capillary refill takes less than 2 seconds.      Coloration: Skin is not jaundiced or pale.      Findings: No bruising.   Neurological:      Mental Status: He is alert and oriented to person, place, and time. Mental status is at baseline.      Cranial Nerves: No cranial nerve deficit.   Psychiatric:         Mood and Affect: Mood normal.         Behavior: Behavior normal. Behavior is cooperative.         Thought Content: Thought content normal.         Judgment: Judgment normal.     ---------------------------------------------------------------------------------------------------------------------  EK/3/2024: Third degree heart block with a heart rate of 68 and a QTc of 431 ms.  There are inverted T waves in leads V1-V3 and poor R wave progression with 2 PVCs.    2024: Normal sinus rhythm with a heart rate of 83 and a QTc of 427 ms.  The R wave progression is slightly improved.  There is only a T wave inversion in lead V1.  There is no obvious block.    Please note that I have personally looked at the EKG from this admission and the above is my interpretation of this admission's EKG; I await the final cardiology read.  Please note that there are no comparison EKGs in our system.      Telemetry:  NS with heart rates 50-60; then out of no where he has drops to the 30s.  No blocks.  Please note that I personally looked at the telemetry strips.      Last echocardiogram:  Ordered  --------------------------------------------------------------------------------------------------------------------  LABS:    CBC and coagulation:  Results from last 7 days   Lab Units 24  0327 24  1733   WBC 10*3/mm3 7.58 7.81   HEMOGLOBIN g/dL 13.8 13.9   HEMATOCRIT % 42.0 41.1   MCV fL 91.7 90.3   MCHC g/dL 32.9 33.8   PLATELETS 10*3/mm3 358 391   INR   --  0.99     Renal and  electrolytes:  Results from last 7 days   Lab Units 06/14/24 0327 06/13/24 1934 06/13/24  1733   SODIUM mmol/L 140  --  139   POTASSIUM mmol/L 3.6  --  3.9   MAGNESIUM mg/dL 2.2  --  2.3   CHLORIDE mmol/L 108*  --  105   CO2 mmol/L 22.6  --  23.7   BUN mg/dL 11  --  12   CREATININE mg/dL 0.75*  --  0.81   CALCIUM mg/dL 8.7  --  9.3   PHOSPHORUS mg/dL  --  3.7  --    GLUCOSE mg/dL 108*  --  99   ANION GAP mmol/L 9.4  --  10.3     Estimated Creatinine Clearance: 134.4 mL/min (A) (by C-G formula based on SCr of 0.75 mg/dL (L)).    Liver and pancreatic function:  Results from last 7 days   Lab Units 06/14/24 0327 06/13/24  1733   ALBUMIN g/dL 3.4* 3.9   BILIRUBIN mg/dL 0.2 0.2   ALK PHOS U/L 93 105   AST (SGOT) U/L 31 37   ALT (SGPT) U/L 40 45*     Endocrine function:  Lab Results   Component Value Date    HGBA1C 5.90 (H) 06/13/2024     Lab Results   Component Value Date    TSH 2.280 06/03/2024     Cardiac:  Results from last 7 days   Lab Units 06/14/24 0327 06/13/24 1934 06/13/24  1733   HSTROP T ng/L 8 7 7     Results from last 7 days   Lab Units 06/14/24 0327   CHOLESTEROL mg/dL 118   TRIGLYCERIDES mg/dL 156*   HDL CHOL mg/dL 31*   LDL CHOL mg/dL 60     Cultures:  Lab Results   Component Value Date    COLORU Yellow 06/14/2024    CLARITYU Clear 06/14/2024    PHUR 6.0 06/14/2024    GLUCOSEU Negative 06/14/2024    KETONESU Negative 06/14/2024    BLOODU Negative 06/14/2024    NITRITEU Negative 06/14/2024    LEUKOCYTESUR Negative 06/14/2024    BILIRUBINUR Negative 06/14/2024    UROBILINOGEN 0.2 E.U./dL 06/14/2024      Latest Reference Range & Units 06/14/24 05:40   Amphetamine, Urine Qual Negative  Negative   Barbiturates Screen, Urine Negative  Negative   Benzodiazepine Screen, Urine Negative  Negative   Buprenorphine, Screen, Urine Negative  Negative   Cocaine Screen, Urine Negative  Negative   Fentanyl, Urine Negative  Negative   Methamphetamine, Ur Negative  Negative   Methadone Screen , Urine Negative  Negative    Opiate Screen Negative  Negative   Oxycodone Screen, Urine Negative  Negative   Phencyclidine (PCP), Urine Negative  Negative   THC Screen, Urine Negative  Negative   Tricyclic Antidepressants Screen Negative  Negative       I have personally looked at the labs and they are summarized above.  ----------------------------------------------------------------------------------------------------------------------  Detailed radiology reports for the last 24 hours:    Imaging Results (Last 24 Hours)       Procedure Component Value Units Date/Time    XR Chest 1 View [[id number]] Collected: 06/13/24 1809     Updated: 06/13/24 1811    Narrative:      Comparison:Isi 3, 2024.     Findings:  Cardiac silhouette is normal size. Pulmonary vascularity appears  unremarkable. Emphysema right upper lung. No focal airspace disease,  pneumothorax or pleural effusion seen. Osseous structures are intact.       Impression:      Impression:  1. No acute cardiopulmonary process.        This report was finalized on 6/13/2024 6:09 PM by Napoleon Bentley DO.             Final impressions for the last 30 days of radiology reports:    XR Chest 1 View  Result Date: 6/3/2024  No radiographic evidence of acute cardiac or pulmonary disease.    This report was finalized on 6/3/2024 2:51 PM by Dr. Paul Hopkins MD.         I have personally looked at the radiology images and I have read the available final reports.    Assessment & Plan       -Chest pain and third-degree heart block that occurred 10 days prior to admission  -History of essential hypertension  -History of hyperlipidemia  -Obesity, BMI 33.43 kg meter square, which complicates all aspects of care    Admitted to the telemetry floor.  I will obtain an echocardiogram and a stress test.  I will consult cardiology.  I have ordered a fasting lipid panel.  I had added a TSH and a hemoglobin A1c to blood in lab and the hemoglobin A1c came back elevated in the prediabetes range at 5.9.  I await  his medicine reconciliation to be performed; so far, the blood pressures are at goal and stable.    VTE Prophylaxis:  Heparin SQ      The patient is high risk due to the following diagnoses/reasons:  Third degree heart block    Buffy Bradford MD  [facility]  06/14/24  06:14 EDT

## 2024-06-14 NOTE — PLAN OF CARE
Goal Outcome Evaluation:               No acute events overnight. No complaints of chest pain. Patient has been NPO since midnight for cardiology consult. Episodes of apnea while sleeping 3L NC applied.  tablet used to communicate with patient in [place]ovan.

## 2024-06-14 NOTE — PLAN OF CARE
Goal Outcome Evaluation:  Plan of Care Reviewed With: patient        Progress: no change      Problem: Adult Inpatient Plan of Care  Goal: Plan of Care Review  6/14/2024 1711 by Danii Craig RNA  Outcome: Ongoing, Progressing  Flowsheets (Taken 6/14/2024 1711)  Outcome Evaluation: Pt is currently resting in bed.  used to communicate with patient. Stress test and echocardiogram was performed today. Results pending at this time. No complaints of pain or discomfort. VSS, plan of care ongoing.  6/14/2024 1711 by Danii Craig RNA  Outcome: Ongoing, Progressing  Flowsheets (Taken 6/14/2024 1711)  Progress: no change  Plan of Care Reviewed With: patient  Outcome Evaluation: Pt is currently resting in bed.  used to communicate with patient. Stress test and echocardiogram was performed today. Results pending at this time. No complaints of pain or discomfort. VSS, plan of care ongoing.  Goal: Patient-Specific Goal (Individualized)  6/14/2024 1711 by Danii Craig RNA  Outcome: Ongoing, Progressing  6/14/2024 1711 by Danii Craig RNA  Outcome: Ongoing, Progressing  Goal: Absence of Hospital-Acquired Illness or Injury  6/14/2024 1711 by Danii Craig RNA  Outcome: Ongoing, Progressing  6/14/2024 1711 by Danii Craig RNA  Outcome: Ongoing, Progressing  Intervention: Identify and Manage Fall Risk  Recent Flowsheet Documentation  Taken 6/14/2024 1500 by Danii Craig RNA  Safety Promotion/Fall Prevention:   activity supervised   assistive device/personal items within reach   clutter free environment maintained   fall prevention program maintained   gait belt   nonskid shoes/slippers when out of bed   room organization consistent   safety round/check completed  Intervention: Prevent Infection  Recent Flowsheet Documentation  Taken 6/14/2024 1500 by Danii Craig RNA  Infection Prevention: rest/sleep promoted  Goal: Optimal Comfort and Wellbeing  6/14/2024 1711 by Danii Craig RNA  Outcome:  Ongoing, Progressing  6/14/2024 1711 by Danii Craig, RNA  Outcome: Ongoing, Progressing  Goal: Readiness for Transition of Care  6/14/2024 1711 by Danii Craig RNA  Outcome: Ongoing, Progressing  6/14/2024 1711 by Danii Craig, RNA  Outcome: Ongoing, Progressing     Problem: Chest Pain  Goal: Resolution of Chest Pain Symptoms  6/14/2024 1711 by Danii Craig, RNA  Outcome: Ongoing, Progressing  6/14/2024 1711 by Danii Craig, RNA  Outcome: Ongoing, Progressing

## 2024-06-14 NOTE — PLAN OF CARE
Goal Outcome Evaluation:  Plan of Care Reviewed With: patient           Outcome Evaluation: Pt being discharged at this time. Discharge instructions explained via . Pt verbalizes understanding.

## 2024-06-14 NOTE — PROGRESS NOTES
[facility]     Patient Identification:  Name:  Daquan Singh  Age:  43 y.o.  Sex:  male  :  1980  MRN:  [id number]  Visit Number:  [id number]  Primary Care Provider:  Provider, No Known    Length of stay:  1    Chief complaint:  None    Subjective:    No new events overnight.  ----------------------------------------------------------------------------------------------------------------------  Current Hospital Meds:  aspirin, 81 mg, Oral, Daily  atorvastatin, 40 mg, Oral, Daily  heparin (porcine), 5,000 Units, Subcutaneous, Q12H  sodium chloride, 10 mL, Intravenous, Q12H         ----------------------------------------------------------------------------------------------------------------------  Vital Signs:  Temp:  [97.2 °F (36.2 °C)-98.2 °F (36.8 °C)] 97.7 °F (36.5 °C)  Heart Rate:  [58-83] 67  Resp:  [17-18] 18  BP: (114-151)/(64-89) 130/82      24  1724 244   Weight: 74.8 kg (165 lb) 91.1 kg (200 lb 14.4 oz)     Body mass index is 33.43 kg/m².    Intake/Output Summary (Last 24 hours) at 2024 1516  Last data filed at 2024 1305  Gross per 24 hour   Intake 240 ml   Output 1150 ml   Net -910 ml     NPO Diet NPO Type: Sips with Meds, Ice Chips  ----------------------------------------------------------------------------------------------------------------------  Physical exam:  Constitutional: Well-nourished male in no apparent distress.     HENT:  Head:  Normocephalic and atraumatic.  Mouth:  Moist mucous membranes.    Eyes:  Conjunctivae and EOM are normal.  Pupils are equal, round, and reactive to light.  No scleral icterus.    Neck:  Neck supple. No thyromegaly.  No JVD present.    Cardiovascular:  Regular rate and rhythm with no murmurs, rubs, clicks or gallops appreciated.  Pulmonary/Chest:  Clear to auscultation bilaterally with no crackles, wheezes or rhonchi appreciated.  Abdominal:  Soft. Nontender. Nondistended  Bowel  "sounds are normal in all four quadrants. No organomegally appreciated.   Musculoskeletal:  No edema, no tenderness, and no deformity.  No red or swollen joints anywhere.    Neurological:  Alert, Cranial nerves II-XII intact with no focal deficits.  No facial droop.  No slurred speech.   Skin:  Warm and dry to palpation with no rashes or lesions appreciated.  Peripheral vascular:  2+ radial and pedal pulses in bilateral upper and lower extremities.  Psychiatric:  Alert and oriented x3, demonstrates appropriate judgment and insight.  -----------------------------------------------------------------------------------  ----------------------------------------------------------------------------------------------------------------------  Results from last 7 days   Lab Units 06/14/24 0327 06/13/24 1934 06/13/24  1733   HSTROP T ng/L 8 7 7     Results from last 7 days   Lab Units 06/14/24 0327 06/13/24  1733   WBC 10*3/mm3 7.58 7.81   HEMOGLOBIN g/dL 13.8 13.9   HEMATOCRIT % 42.0 41.1   MCV fL 91.7 90.3   MCHC g/dL 32.9 33.8   PLATELETS 10*3/mm3 358 391   INR   --  0.99         Results from last 7 days   Lab Units 06/14/24 0327 06/13/24  1733   SODIUM mmol/L 140 139   POTASSIUM mmol/L 3.6 3.9   MAGNESIUM mg/dL 2.2 2.3   CHLORIDE mmol/L 108* 105   CO2 mmol/L 22.6 23.7   BUN mg/dL 11 12   CREATININE mg/dL 0.75* 0.81   CALCIUM mg/dL 8.7 9.3   GLUCOSE mg/dL 108* 99   ALBUMIN g/dL 3.4* 3.9   BILIRUBIN mg/dL 0.2 0.2   ALK PHOS U/L 93 105   AST (SGOT) U/L 31 37   ALT (SGPT) U/L 40 45*   Estimated Creatinine Clearance: 134.4 mL/min (A) (by C-G formula based on SCr of 0.75 mg/dL (L)).    No results found for: \"AMMONIA\"  Results from last 7 days   Lab Units 06/14/24  0327   CHOLESTEROL mg/dL 118   TRIGLYCERIDES mg/dL 156*   HDL CHOL mg/dL 31*   LDL CHOL mg/dL 60     No results found for: \"BLOODCX\"  No results found for: \"URINECX\"  No results found for: \"WOUNDCX\"  No results found for: \"STOOLCX\"    I have personally looked at the " labs and they are summarized above.  ----------------------------------------------------------------------------------------------------------------------  Imaging Results (Last 24 Hours)       Procedure Component Value Units Date/Time    XR Chest 1 View [[id number]] Collected: 06/13/24 1809     Updated: 06/13/24 1811    Narrative:      Comparison:Isi 3, 2024.     Findings:  Cardiac silhouette is normal size. Pulmonary vascularity appears  unremarkable. Emphysema right upper lung. No focal airspace disease,  pneumothorax or pleural effusion seen. Osseous structures are intact.       Impression:      Impression:  1. No acute cardiopulmonary process.        This report was finalized on 6/13/2024 6:09 PM by Napoleon Bentley DO.             ----------------------------------------------------------------------------------------------------------------------  Assessment and Plan:    History of complete heart block -patient with EKG Isi 3, 2024 demonstrating complete heart with episodes of sinus pauses up to 2.2 seconds.  Patient to have stress test and transthoracic echo today.    2.  Chest pain -stress test and transthoracic echo pending    3.  Essential hypertension -well-controlled    4.  Hyperlipidemia -statin    Disposition currently awaiting transthoracic echo and stress test    Fady Morelos DO   06/14/24   15:16 EDT

## 2024-06-14 NOTE — CONSULTS
[facility]  CONSULT  NOTE      Patient information:  Date of Admit: 6/13/2024  Date of Consult: 06/14/24  Hospitalist/Referring MD:Buffy Bradford MD;   PCP: Provider, No Known  MRN:  [id number]  Visit Number:  [id number]    LOS: 1  CODE STATUS:  Code Status and Medical Interventions:   Ordered at: 06/13/24 2014     Level Of Support Discussed With:    Patient     Code Status (Patient has no pulse and is not breathing):    CPR (Attempt to Resuscitate)     Medical Interventions (Patient has pulse or is breathing):    Full Support       PROBLEM LIST: Principal Problem:    Third degree heart block      Inpatient Cardiology Consult  Consult performed by: Loren Sarabia APRN  Consult ordered by: Buffy Bradford MD        07:48 EDT  6/14/2024    General Cardiology Consulting Physician: Dr. Ellen Salmeron MD    Assessment    Episode of complete heart block on Isi 3, 2024 with episodes of sinus pauses up to 2.2 seconds patient denies syncope or dizzy spells  Chest pain when the patient was seen in the emergency room on 3 Isi but no recurrence patient left AMA  History of essential hypertension  Dyslipidemia  Tobacco abuse  History of alcohol abuse  Prediabetes with hemoglobin A1c of 5.9          Planning   1.  Patient probably will require pacing for STEMI to rule out ischemia we will proceed with stress testing for assessment of coronary anatomy and ischemia  2.  Will get an echocardiogram to assess cardiac function wall motion and valve morphology  3.  Advised to quit smoking  4.  Management of prediabetes as per hospitalist service         Reason for Cardiology consultation: Third-degree heart block on 06/03/2024    Subjective Data   6/14/2024  ADMISSION INFORMATION:  Chief Complaint   Patient presents with    Chest Pain     History of Present Illness    Daquan Singh is a 43 y.o. male with a past medical history significant for 3rd degree heart block,  HTN and HLD.     Patient presented to [facility] emergency room (ER) on 6/13/2024 with complaints of chest pain/follow-up.     Of note, per emergency room note, patient was seen in the ER on Isi 3, 2024 and diagnosed with complete heart block at that time.  Patient left AMA.  Patient was apparently called back by the hospital and told to return to the ER.  Patient reported that he was asymptomatic and denied any chest pain, palpitations, nausea, vomiting, hemoptysis, fever, chills, diaphoresis, or cough.  However he did report mild shortness of breath and intermittent mild orthopnea.  Patient denied any exacerbating or alleviating factors. Patient reported he does work during the day and has not had any complaints while working    Of note, patient has a history of ETOH use which is estimated to be 5 cans of beer/ week  from chart review.    While in the ER, HS troponin was 7-> 8, potassium was 3.9, creatinine 0.81, magnesium was 2.3, hemoglobin was 13.9 and platelet count was 391.  TSH on 6/3/2024 was 2.280. EKG revealed SR with first-degree AV block with ventricular rate of 83 bpm, no ST segment elevation, depression, T wave version or signs of acute ischemia or dysrhythmia noted per Dr. Woody in ER.     Cardiology has been consulted for further evaluation and management for third-degree heart block on 06/03/2024.     Primary Cardiologist: None.       Overnight telemetry reveals SB/ SR 30s-60s.  Please see telemetry strips attached below.    Patient was in room 325 when he was seen and examined by Dr. Salmeron.  Dr. Salmeron was able to speak Citizen of [place] and communicate with patient. Patient is sitting in chair at bedside resting quietly.  No acute distress noted at this time. He denied any dizziness, lightheadedness, diaphoresis, nausea, vomiting, chest pain, shortness of breath or palpitations. He does report he does smoke 1 PPD x 15 years. Discussed further evaluation with Stress Test and patient  agreed to proceed with this testing today.     EVENT TIMELINE:  6/14: Treadmill stress test and ECHO both scheduled today with results pending       Known medications given enroute via EMS and in the ER:         Personal History     Cardiac risk factors:hypercholesterolemia and hypertension      Last Echo:  None found in patient's chart.      Last Stress: None found in patient's chart.      Last Cath:  None found in patient's chart.                         Past Medical History:   Diagnosis Date    Hyperlipidemia     Hypertension     Third degree heart block 06/13/2024     Past Surgical History:   Procedure Laterality Date    NO PAST SURGERIES       Family History   Problem Relation Age of Onset    Heart disease Other         Not at a young age    Diabetes Other      Social History     Tobacco Use    Smoking status: Never    Smokeless tobacco: Never   Vaping Use    Vaping status: Never Used   Substance Use Topics    Alcohol use: Yes     Alcohol/week: 5.0 standard drinks of alcohol     Types: 5 Cans of beer per week    Drug use: Never     ALLERGIES: Patient has no known allergies.    Medications listed below are reported home medications pulling from within the system:  Medications Prior to Admission   Medication Sig Dispense Refill Last Dose    aspirin 325 MG EC tablet Take 1 tablet by mouth Daily. 90 tablet 0 6/13/2024    atorvastatin (LIPITOR) 10 MG tablet Take 2 tablets by mouth Daily. 90 tablet 0 6/13/2024       Review of Systems   Constitutional:  Negative for activity change, diaphoresis and unexpected weight change.   HENT:  Negative for facial swelling and trouble swallowing.    Eyes:  Negative for visual disturbance.   Respiratory:  Negative for apnea, cough, chest tightness, shortness of breath, wheezing and stridor.    Cardiovascular:  Negative for chest pain, palpitations and leg swelling.   Gastrointestinal:  Negative for abdominal distention, nausea and vomiting.   Endocrine: Negative.    Genitourinary:  Negative.    Musculoskeletal: Negative.    Skin:  Negative for color change.   Neurological:  Negative for dizziness, syncope, speech difficulty and weakness.   Psychiatric/Behavioral:  Negative for agitation and behavioral problems.      Objective Data   Vital Signs  Temp:  [97.2 °F (36.2 °C)-98.2 °F (36.8 °C)] 97.7 °F (36.5 °C)  Heart Rate:  [58-83] 68  Resp:  [17-18] 18  BP: (114-151)/(64-89) 127/81  Flow (L/min):  [3] 3  Device (Oxygen Therapy): room air  Vital Signs (last 72 hrs)         06/11 0700  06/12 0659 06/12 0700 06/13 0659 06/13 0700  06/14 0659 06/14 0700  06/14 0748   Most Recent      Temp (°F)     97.2 -  98.2       97.7 (36.5) 06/14 0654    Heart Rate     58 -  83       68 06/14 0654    Resp     17 -  18       18 06/14 0654    BP     114/64 -  151/89       127/81 06/14 0654    SpO2 (%)     90 -  96       91 06/14 0654    Flow (L/min)       3       3  Comment: only when sleeping 06/13 2200          BMI:   Body mass index is 33.43 kg/m².  WEIGHT:  Wt Readings from Last 3 Encounters:   06/13/24 91.1 kg (200 lb 14.4 oz)   06/03/24 75 kg (165 lb 5.5 oz)     DIET:  NPO Diet NPO Type: Sips with Meds, Ice Chips  I&O:  Intake & Output (last 3 days)         06/11 0701  06/12 0700 06/12 0701  06/13 0700 06/13 0701  06/14 0700 06/14 0701  06/15 0700    P.O.   240     Total Intake(mL/kg)   240 (2.6)     Urine (mL/kg/hr)   900     Total Output   900     Net   -660                   Physical Exam  Constitutional:       General: He is not in acute distress.     Appearance: Normal appearance. He is not ill-appearing, toxic-appearing or diaphoretic.   HENT:      Head: Normocephalic and atraumatic.      Nose: Nose normal.      Mouth/Throat:      Mouth: Mucous membranes are moist.   Eyes:      Extraocular Movements: Extraocular movements intact.      Pupils: Pupils are equal, round, and reactive to light.   Cardiovascular:      Rate and Rhythm: Normal rate and regular rhythm.      Heart sounds: Normal heart sounds.  "  Pulmonary:      Effort: Pulmonary effort is normal.      Breath sounds: Normal breath sounds.   Abdominal:      General: Bowel sounds are normal.      Palpations: Abdomen is soft.   Musculoskeletal:         General: Normal range of motion.      Cervical back: Normal range of motion.   Skin:     General: Skin is warm and dry.   Neurological:      General: No focal deficit present.      Mental Status: He is alert and oriented to person, place, and time. Mental status is at baseline.   Psychiatric:         Mood and Affect: Mood normal.         Behavior: Behavior normal.         Thought Content: Thought content normal.         Judgment: Judgment normal.       Results review   Results Review:    I have reviewed the patient's new clinical results. 06/14/24 10:44 EDT    Results from last 7 days   Lab Units 06/14/24  0327 06/13/24  1934 06/13/24  1733   HSTROP T ng/L 8 7 7     No results found for: \"PROBNP\"  Results from last 7 days   Lab Units 06/14/24  0327 06/13/24  1733   WBC 10*3/mm3 7.58 7.81   HEMOGLOBIN g/dL 13.8 13.9   PLATELETS 10*3/mm3 358 391     Results from last 7 days   Lab Units 06/14/24  0327 06/13/24  1733   SODIUM mmol/L 140 139   POTASSIUM mmol/L 3.6 3.9   CHLORIDE mmol/L 108* 105   CO2 mmol/L 22.6 23.7   BUN mg/dL 11 12   CREATININE mg/dL 0.75* 0.81   CALCIUM mg/dL 8.7 9.3   GLUCOSE mg/dL 108* 99   ALT (SGPT) U/L 40 45*   AST (SGOT) U/L 31 37     Lab Results   Component Value Date    MG 2.2 06/14/2024    MG 2.3 06/13/2024    MG 2.1 06/03/2024     Lab Results   Component Value Date    CHOL 118 06/14/2024    TRIG 156 (H) 06/14/2024    HDL 31 (L) 06/14/2024    LDL 60 06/14/2024     Estimated Creatinine Clearance: 134.4 mL/min (A) (by C-G formula based on SCr of 0.75 mg/dL (L)).  Lab Results   Component Value Date    HGBA1C 5.90 (H) 06/13/2024     Lab Results   Component Value Date    INR 0.99 06/13/2024     No results found for: \"LABHEPA\"  No components found for: \"DIG\"  Lab Results   Component Value " "Date    TSH 2.280 2024      No results found for: \"URICACID\"  Pain Management Panel          Latest Ref Rng & Units 2024   Pain Management Panel   Amphetamine, Urine Qual Negative Negative    Barbiturates Screen, Urine Negative Negative    Benzodiazepine Screen, Urine Negative Negative    Buprenorphine, Screen, Urine Negative Negative    Cocaine Screen, Urine Negative Negative    Fentanyl, Urine Negative Negative    Methadone Screen , Urine Negative Negative    Methamphetamine, Ur Negative Negative      Microbiology Results (last 10 days)       ** No results found for the last 240 hours. **           No results found for: \"BLOODCX\"      EC2024        ECG/EMG Results (last 24 hours)       Procedure Component Value Units Date/Time    ECG 12 Lead ED Triage Standing Order; Chest Pain [[id number]] Collected: 24 1720     Updated: 24 1719     QT Interval 364 ms      QTC Interval 427 ms     Narrative:      Test Reason : ED Triage Standing Order~  Blood Pressure :   */*   mmHG  Vent. Rate :  83 BPM     Atrial Rate :  83 BPM     P-R Int : 222 ms          QRS Dur :  94 ms      QT Int : 364 ms       P-R-T Axes :  16  41  28 degrees     QTc Int : 427 ms    Sinus rhythm with 1st degree AV block  Low voltage QRS  Borderline ECG  When compared with ECG of 2024 14:12,  Sinus rhythm has replaced Junctional rhythm  Incomplete right bundle branch block is no longer present    Referred By: MELANIA           Confirmed By:     Telemetry Scan [[id number]] Resulted: 24     Updated: 24 0611    Telemetry Scan [[id number]] Resulted: 24     Updated: 24 0721            TELEMETRY:   SB/ SR 30s-60s                  RADIOLOGY STUDIES:  Imaging Results (Last 72 Hours)       Procedure Component Value Units Date/Time    XR Chest 1 View [[id number]] Collected: 24 1809     Updated: 24 1811    Narrative:      Comparison:Isi 3, 2024.     Findings:  Cardiac " silhouette is normal size. Pulmonary vascularity appears  unremarkable. Emphysema right upper lung. No focal airspace disease,  pneumothorax or pleural effusion seen. Osseous structures are intact.       Impression:      Impression:  1. No acute cardiopulmonary process.        This report was finalized on 6/13/2024 6:09 PM by Napoleon Bentley DO.               ALLERGIES: Patient has no known allergies.    CURRENT MEDICATIONS:  Current list of medications may not reflect those currently placed in orders that are not signed or are being held.     aspirin, 81 mg, Oral, Daily  atorvastatin, 40 mg, Oral, Daily  heparin (porcine), 5,000 Units, Subcutaneous, Q12H  sodium chloride, 10 mL, Intravenous, Q12H           senna-docusate sodium **AND** polyethylene glycol **AND** bisacodyl **AND** bisacodyl    dextrose    dextrose    glucagon (human recombinant)    nitroglycerin    sodium chloride    sodium chloride    sodium chloride        Thank you very much for asking us to be involved in this patient's care.  We will follow along with you. Please do not hesitate to call for any questions or concerns.     I have discussed the patients findings and recommendations with the patient.    Electronically signed by SYED Pardo, 06/14/24, 10:45 AM EDT.                       Please note that portions of this note were copied and has been reviewed and is accurate as of 6/14/2024 .      Please note that portions of this note were completed with a voice recognition program.

## 2024-06-14 NOTE — DISCHARGE SUMMARY
[facility] HOSPITALIST MEDICINE DISCHARGE SUMMARY    Patient Identification:  Name:  Daquan Singh  Age:  43 y.o.  Sex:  male  :  1980  MRN:  [id number]  Visit Number:  [id number]    Date of Admission: 2024  Date of Discharge: 2024    PCP: Provider, No Known    DISCHARGE DIAGNOSIS   Episode of third-degree heart block  Chest pain  Essential hypertension  Hyperlipidemia  Tobacco abuse      CONSULTS  Dr. Salmeron, Cardiology      PROCEDURES PERFORMED   None      HOSPITAL COURSE  Mr. Singh is a 43 y.o. male who presented to [facility] ED on 2024 with a chief complaint of chest pain.  Patient has a past medical history markable for essential hypertension and hyperlipidemia.  It should be noted patient was recently evaluated in our emergency department on 6/3/2024 where he was being evaluated for substernal chest pressure radiating to his left arm.  EKG obtained at that time demonstrated third-degree AV block.  Troponins were unremarkable.  Unfortunately, patient left the emergency department AGAINST MEDICAL ADVICE at that time.  Patient did receive a phone call to report to local cardiologist office for further evaluation.  However, when patient arrived at the local cardiology office, there is no appointment made for him and he was directed to the emergency department.  When patient arrived in the emergency department, he was thoroughly evaluated with physical exam, vital signs and basic laboratory work.  Please see initial H&P for specific details.  Initial workup was all essentially within normal limits.  However, secondary to history of third-degree AV block, he was admitted for further treatment and evaluation.    Cardiology services were consulted who thoroughly evaluated the patient.  After thorough evaluation, recommendation was made to obtain transthoracic echocardiogram and stress test.  Stress test obtained on 2024 demonstrated normal myocardial  perfusion study with no evidence of ischemia.  Transthoracic echocardiogram demonstrated normal left ventricular systolic function with estimated EF of 60 to 65%.  After further discussion with cardiology services, there appears to be no further indication for ischemic evaluation.  However, with history of third-degree AV block, recommendation was made to have patient follow-up with cardiology services in the outpatient setting for consideration of Holter monitor placement.  This plan was thoroughly discussed with the patient using an  service and patient expressed his understanding and willingness to proceed with this plan.  With this in mind, it is felt patient has reached maximum medical benefit of current hospitalization and will be discharged home in stable condition today.  The beforementioned plan was thoroughly discussed with the patient and he expressed his understanding and willingness to proceed with the beforementioned plan.    VITAL SIGNS:      06/13/24  1724 06/13/24  2154   Weight: 74.8 kg (165 lb) 91.1 kg (200 lb 14.4 oz)     Body mass index is 33.43 kg/m².    PHYSICAL EXAM:  General -well-nourished male appearing stated age in no apparent distress  HEENT -head normocephalic and atraumatic, pupils equally round and reactive to light  Lungs -clear to auscultation bilaterally with no wheezes, rales or rhonchi appreciated  Cardiovascular -Giller rate and rhythm with no murmurs, rubs or clicks appreciated  GI -abdomen soft, nontender nondistended  Neurological -cranial nerves II through XII intact with no focal deficit or unintentional motor movement appreciated    DISCHARGE DISPOSITION   Stable    DISCHARGE MEDICATIONS:     Discharge Medications        Continue These Medications        Instructions Start Date   aspirin 325 MG EC tablet   325 mg, Oral, Daily      atorvastatin 10 MG tablet  Commonly known as: LIPITOR   20 mg, Oral, Daily                     Additional Instructions for the  Follow-ups that You Need to Schedule       Discharge Follow-up with PCP   As directed       Currently Documented PCP:    Provider, No Known    PCP Phone Number:    [phone]     Follow Up Details: Please follow-up with PCP in 1 week        Discharge Follow-up with Specialty: Cardiology; 1 Week   As directed      Specialty: Cardiology   Follow Up: 1 Week   Follow Up Details: History of third-degree AV block               Follow-up Information       Provider, No Known .    Why: Please follow-up with PCP in 1 week  Contact information:  [facility] [address]  [phone]                             TEST  RESULTS PENDING AT DISCHARGE       Fady Morelos DO  06/14/24  19:20 EDT    Please note that this discharge summary required more than 30 minutes to complete.    Please send a copy of this dictation to the following providers:  Provider, No Known

## 2024-06-17 ENCOUNTER — TELEPHONE (OUTPATIENT)
Dept: TELEMETRY | Facility: HOSPITAL | Age: 44
End: 2024-06-17

## 2024-07-17 ENCOUNTER — APPOINTMENT (OUTPATIENT)
Dept: CT IMAGING | Facility: HOSPITAL | Age: 44
End: 2024-07-17

## 2024-07-17 ENCOUNTER — HOSPITAL ENCOUNTER (EMERGENCY)
Facility: HOSPITAL | Age: 44
Discharge: HOME | End: 2024-07-17
Attending: STUDENT IN AN ORGANIZED HEALTH CARE EDUCATION/TRAINING PROGRAM

## 2024-07-17 ENCOUNTER — APPOINTMENT (OUTPATIENT)
Dept: GENERAL RADIOLOGY | Facility: HOSPITAL | Age: 44
End: 2024-07-17

## 2024-07-17 VITALS
DIASTOLIC BLOOD PRESSURE: 69 MMHG | HEIGHT: 66 IN | SYSTOLIC BLOOD PRESSURE: 116 MMHG | BODY MASS INDEX: 31.27 KG/M2 | RESPIRATION RATE: 18 BRPM | HEART RATE: 83 BPM | OXYGEN SATURATION: 97 % | WEIGHT: 194.6 LBS | TEMPERATURE: 97.9 F

## 2024-07-17 DIAGNOSIS — M25.511 ACUTE PAIN OF RIGHT SHOULDER: ICD-10-CM

## 2024-07-17 DIAGNOSIS — M54.2 NECK PAIN: Primary | ICD-10-CM

## 2024-07-17 PROCEDURE — 73030 X-RAY EXAM OF SHOULDER: CPT

## 2024-07-17 PROCEDURE — 99284 EMERGENCY DEPT VISIT MOD MDM: CPT

## 2024-07-17 PROCEDURE — 72125 CT NECK SPINE W/O DYE: CPT

## 2024-07-17 PROCEDURE — 70450 CT HEAD/BRAIN W/O DYE: CPT | Performed by: RADIOLOGY

## 2024-07-17 PROCEDURE — 70450 CT HEAD/BRAIN W/O DYE: CPT

## 2024-07-17 PROCEDURE — 72125 CT NECK SPINE W/O DYE: CPT | Performed by: RADIOLOGY

## 2024-07-17 PROCEDURE — 73030 X-RAY EXAM OF SHOULDER: CPT | Performed by: RADIOLOGY

## 2024-07-17 RX ORDER — METHOCARBAMOL 500 MG/1
500 TABLET, FILM COATED ORAL 4 TIMES DAILY PRN
Qty: 12 TABLET | Refills: 0 | Status: SHIPPED | OUTPATIENT
Start: 2024-07-17
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
## 2024-07-18 NOTE — DISCHARGE INSTRUCTIONS
Call one of the offices below to establish a primary care provider.  If you are unable to get an appointment and feel it is an emergency and need to be seen immediately please return to the Emergency Department    Call one of the officee below to set up a primary care provider.       Dr. Carty  [address]  [place] [id number] [phone]    [facility] ([facility])  [address]  [facility] [address] [phone]    [facility] ([place] [facility])                                                                                                       [address] [id number] [phone]    [facility])  [address]   [address]  [place] [address]  [phone]    [facility])  [address]  [place] [address]  Phone: [phone]    [facility]  [address] [place] [id number] [phone]    [facility])  [address] [place] [address]  Phone: [phone]    Dr. Mima Marin  [address]  [facility] [id number] [phone]    [place] [address]  [phone]    [facility]  Maureen Shorts, APRN  [address] [place] [address]  [phone]    Dr. Mock and [facility]   [address]  [address]  [place] [address]  [phone]